# Patient Record
Sex: FEMALE | Race: BLACK OR AFRICAN AMERICAN | Employment: FULL TIME | URBAN - METROPOLITAN AREA
[De-identification: names, ages, dates, MRNs, and addresses within clinical notes are randomized per-mention and may not be internally consistent; named-entity substitution may affect disease eponyms.]

---

## 2017-05-19 ENCOUNTER — DOCUMENTATION ONLY (OUTPATIENT)
Dept: SLEEP MEDICINE | Age: 42
End: 2017-05-19

## 2017-07-31 ENCOUNTER — OFFICE VISIT (OUTPATIENT)
Dept: NEUROLOGY | Age: 42
End: 2017-07-31

## 2017-07-31 VITALS — OXYGEN SATURATION: 98 % | SYSTOLIC BLOOD PRESSURE: 122 MMHG | HEART RATE: 78 BPM | DIASTOLIC BLOOD PRESSURE: 79 MMHG

## 2017-07-31 DIAGNOSIS — G43.901 STATUS MIGRAINOSUS: Primary | ICD-10-CM

## 2017-07-31 RX ORDER — TOPIRAMATE 50 MG/1
100 TABLET, FILM COATED ORAL
Qty: 60 TAB | Refills: 5 | Status: SHIPPED | OUTPATIENT
Start: 2017-07-31 | End: 2018-02-22 | Stop reason: ALTCHOICE

## 2017-07-31 RX ORDER — DIPHENHYDRAMINE HCL 25 MG
TABLET ORAL
COMMUNITY
Start: 2017-07-27 | End: 2018-02-22 | Stop reason: ALTCHOICE

## 2017-07-31 RX ORDER — BUTALBITAL, ACETAMINOPHEN AND CAFFEINE 50; 325; 40 MG/1; MG/1; MG/1
TABLET ORAL
Refills: 0 | COMMUNITY
Start: 2017-07-24 | End: 2018-02-22 | Stop reason: ALTCHOICE

## 2017-07-31 RX ORDER — METHYLPREDNISOLONE 4 MG/1
TABLET ORAL
Qty: 1 DOSE PACK | Refills: 0 | Status: SHIPPED | OUTPATIENT
Start: 2017-07-31 | End: 2018-02-22 | Stop reason: ALTCHOICE

## 2017-07-31 RX ORDER — IBUPROFEN 800 MG/1
TABLET ORAL
COMMUNITY
Start: 2017-07-27 | End: 2018-02-22 | Stop reason: ALTCHOICE

## 2017-07-31 RX ORDER — SUMATRIPTAN 100 MG/1
100 TABLET, FILM COATED ORAL
Qty: 9 TAB | Refills: 5 | Status: SHIPPED | OUTPATIENT
Start: 2017-07-31 | End: 2017-11-13 | Stop reason: SDUPTHER

## 2017-07-31 RX ORDER — METOCLOPRAMIDE 10 MG/1
TABLET ORAL
Refills: 0 | COMMUNITY
Start: 2017-07-26 | End: 2018-02-22 | Stop reason: ALTCHOICE

## 2017-07-31 RX ORDER — PROCHLORPERAZINE MALEATE 10 MG
TABLET ORAL
COMMUNITY
Start: 2017-07-27 | End: 2018-02-22 | Stop reason: ALTCHOICE

## 2017-07-31 NOTE — LETTER
Dear Mark Solorzano MD, Thank you for allowing me to see your patient, Pramod Tijerina for a neurological consultation. Please see my impression and recommendations as outlined in my note. Sincerely, Weldon Romberg, MD 
[facility] at [facility] 
 
Patient states she hasn't had migraines since 2012 and then they suddetnly returned last Thursday. She went to the ER at [facility] the 24th and the 25th. Also [facility] on the 26th. NEUROLOGY NEW PATIENT CONSULTATION 
 
REFERRED BY: 
Mark Solorzano MD 
 
CHIEF COMPLAINT: 
 
 
HISTORY OF PRESENT ILLNESS HISTORY PROVIDED BY: 
Patient Other: Friend Pramod Tijerina is a 38 y.o. female who I am asked to see in consultation for migraines. She started with migraines when she was younger. In 2012 she was seen by Dr. Nai De La Torre and was placed on meds. She had a sleep study done at [facility]. She didn't get those results, because she did not come back for a visit, because she was doing well. She had relief of her migraines in 2012 with topamax. She states she just turned 38 and had her full exam and mammogram. She is doing a high protein and low carb diet for 6 months and then will get gastric bypass. Two weeks ago she had sudden onset of chills, etc. But this progressed this to full migriane. She has now had this migraine for 2 weeks. She has tried multiple medications. She is currently on a regimen of Benadryl, Reglan, and ibuprofen 3 times a day. She is also been given IV steroids several times when she was seen in the emergency room. She did have a CT of the head that was negative. She did get a repeat study and she was put on a CPAP. She is doing fine with this. She is getting adjusted today. Headache Characterization: throbbing/pressure Pain Level: 10/10 Aura: no 
Frequency/Length: daily all day Location: temples and pushing on the back of the head Nausea/Vomiting: Y Photophobia: Y Phonophobia: Y Provoking factors: loud noises, TV, screen on phone Relieving factors: medication, dim lights, sleeping Focal neurologic symptoms with headache: none Meds: 
Prior abortive tx: imitrex, sumavel Prior preventative tx: topamax Current abortive tx: benadryl 50mg TID, ibuprofen and reglan 1 tab TID (been doing this since Wed) Current preventative tx: none Family Hx of headaches/migraines: none Social: 
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 However, after having a 3 pound weight gain she became more invested in her diet in the last 2 weeks. She is significantly, all sugar, etc. 
 
Patient notes she does have a low vitamin D but is not on replacement at this time. PMH Past Medical History:  
Diagnosis Date  Headache  Hx of migraines [facility] Social History Social History  Marital status: SINGLE Spouse name: N/A  
 Number of children: N/A  
 Years of education: N/A Social History Main Topics  Smoking status: Never Smoker  Smokeless tobacco: Never Used  Alcohol use Yes Comment: social drinker  Drug use: No  
 Sexual activity: Not Currently Partners: Male Birth control/ protection: None, IUD Other Topics Concern  None Social History Narrative [facility] Family History Problem Relation Age of Onset  Seizures Maternal Grandmother ALLERGIES Allergies Allergen Reactions  Latex Unknown (comments) CURRENT MEDS Current Outpatient Prescriptions Medication Sig Dispense Refill  metoclopramide HCl (REGLAN) 10 mg tablet TAKE 1 TABLET BY MOUTH EVERY 6 HOURS IF NEEDED FOR MIGRAINE EXACERBATION  0  
 ibuprofen (MOTRIN) 800 mg tablet  butalbital-acetaminophen-caffeine (FIORICET, ESGIC) -40 mg per tablet TAKE 1-2 TABLETS EVERY 4-6 HOURS AS NEEDED FOR PAIN  0  
 ALLERGY 25 mg tablet  prochlorperazine (COMPAZINE) 10 mg tablet  SUMAtriptan (IMITREX) 100 mg tablet Take 1 Tab by mouth once as needed for Migraine for 1 dose. 9 Tab 3 REVIEW OF SYSTEMS:  
 
Y  N       Y  N  Y  N   Y  N 
  AIDS            Falls    Memory Loss     Shortness of breath Anxiety            Fatigue   Muscle Pain           Skipped beats Chest Pain     Frequent HA   Ms Weakness        Snoring Constipation  Hearing loss   Nause/Vomiting     Stomach Pain Cough           Hepatitis   Neuropathy            Swallowing difficulty Depression   Incontinence   Poor appetite         Vertigo Diarrhea         Joint Pain   Rash                      Visual disturbances Fainting          Leg Swelling   Ringing ears          Weight changes Unable to obtain  ROS due to  mental status change  sedated   intubated PREVIOUS WORKUP IMAGING: CT head negative per patient Reviewed labs from her [facility] visit. These were within normal limits. This included a CBC and CMP. LABS Results for orders placed or performed during the hospital encounter of 12/14/12 AARON, OTHER SOURCES Result Value Ref Range Specimen Description: CERVIX Special Requests: NO SPECIAL REQUESTS   
 KOH NO YEAST SEEN Report Status 12/14/2012 FINAL   
WET PREP Result Value Ref Range Clue cells CLUE CELLS PRESENT Wet prep NO TRICHOMONAS SEEN   
CHLAMYDIA/GC AMPLIFIED Result Value Ref Range Sample type SWAB Source ENDOCERVICAL Chlamydia amplified NEGATIVE  NEGATIVE  
 N. gonorrhea, amplified NEGATIVE  NEGATIVE Comment (NOTE) CULTURE, URINE Result Value Ref Range Specimen Description: URINE Special Requests: NO SPECIAL REQUESTS [place] Count >100,000 COLONIES/mL Culture result: MIXED SKIN SEFERINO ISOLATED  Report Status 12/16/2012 FINAL   
URINALYSIS W/ REFLEX CULTURE  
 Result Value Ref Range Color YELLOW Appearance CLOUDY Specific gravity 1.025 1.003 - 1.030    
 pH (UA) 7.0 5.0 - 8.0 Protein NEGATIVE  NEGATIVE MG/DL Glucose NEGATIVE  NEGATIVE MG/DL Ketone NEGATIVE  NEGATIVE MG/DL Bilirubin NEGATIVE  NEGATIVE Blood NEGATIVE  NEGATIVE Urobilinogen 1.0 0.2 - 1.0 EU/DL Nitrites NEGATIVE  NEGATIVE Leukocyte Esterase NEGATIVE  NEGATIVE  
 WBC 0-4 0 - 4 /HPF  
 RBC 0-3 0 - 5 /HPF Epithelial cells 20-50 0 - 5 /LPF Bacteria 3+ (A) NEGATIVE /HPF  
 UA:UC IF INDICATED URINE CULTURE ORDERED Mucus 2+ (A) NEGATIVE /LPF HCG URINE, QL. - POC Result Value Ref Range Pregnancy test,urine (POC) NEGATIVE  NEGATIVE  
POC CHEM8 Result Value Ref Range Calcium, ionized (POC) 1.22 1.12 - 1.32 MMOL/L Sodium (POC) 140 136 - 145 MMOL/L Potassium (POC) 3.9 3.5 - 5.1 MMOL/L Chloride (POC) 103 98 - 107 MMOL/L  
 CO2 (POC) 27 21 - 32 MMOL/L Anion gap (POC) 15 5 - 15 mmol/L Glucose (POC) 94 65 - 105 MG/DL  
 BUN (POC) 7 (L) 9 - 20 MG/DL Creatinine (POC) 0.8 0.6 - 1.3 MG/DL  
 GFRAA, POC >60 >60 ml/min/1.73m2 GFRNA, POC >60 >60 ml/min/1.73m2 Hemoglobin (POC) 12.2 11.5 - 16.0 GM/DL Hematocrit (POC) 36 35.0 - 47.0 % Comment Comment Not Indicated. PHYSICAL EXAM 
Visit Vitals  /79  Pulse 78  SpO2 98% General:  Alert, cooperative, no distress. Head:  Normocephalic, without obvious abnormality, atraumatic. Eyes:  Conjunctivae/corneas clear. Pupils equal, round, reactive to light. Extraocular movements intact, VFF, NO papilledema Lungs: 
Heart:   Non labored breathing Regular rate and rhythm, no carotid bruits Abdomen:   Soft, non-distended Extremities: Extremities normal, atraumatic, no cyanosis or edema. Pulses: 2+ and symmetric all extremities. Skin: Skin color, texture, turgor normal. No rashes or lesions.   
Neurologic:  Gen: Attention normal 
 Language: naming, repetition, fluency normal 
           Memory: intact recent and remote memory Cranial Nerves: 
I: smell Not tested II: visual fields Full to confrontation II: pupils Equal, round, reactive to light II: optic disc No papilledema III,VII: ptosis none III,IV,VI: extraocular muscles  Full ROM V: mastication normal  
V: facial light touch sensation  normal  
VII: facial muscle function   symmetric VIII: hearing symmetric IX: soft palate elevation  normal  
XI: trapezius strength  5/5 XI: sternocleidomastoid strength 5/5 XI: neck flexion strength  5/5 XII: tongue  midline Motor: normal bulk and tone, no tremor Strength: 5/5 all four extremities Sensory: intact to LT, PP, vibration, and temperature Coordination: FTN intact, Rhomberg negative Gait: normal gait including tandem Reflexes: 2+ throughout IMPRESSION Jaylen Kessler is a 38 y.o. female who presents for evaluation of migraines. Patient does have a history of migraines. They have acutely become exacerbated in the last 2 weeks. Currently she is having status migrainosus. She has had multiple hospital visits due to this. Will start on a preventative and attempt to fix her abortive regimen. I am concerned she may be experiencing rebound headache at this point and went to stop this. She did have CT of the head that was negative and neuro exam is nonfocal so will not do further imaging at this time. RECOMMENDATIONS 1. We will start preventative with Topamax. Will start 25 mg nightly and titrate. Side effects discussed with patient. 2.  We will do Imitrex for abortive. Advised patient to only use the Reglan/ibuprofen/Benadryl combo for severe migraine and not as a scheduled regimen 3. Will call if needs to do a Nancy protocol 4. Prior records from [facility] reviewed. These are in the media tab. 5.  CT head was negative. Will hold off on further imaging at this time. 6.  Migraine book given 7. Encouraged patient to have her CPAP evaluated today to see if adjustments are necessary. FU 3 months Rhonda Langley MD 
 
CC: Villa Staley MD 
Fax: [phone] This note was created using voice recognition software. Despite editing, there may be syntax errors. This note will not be viewable in [address]

## 2017-07-31 NOTE — MR AVS SNAPSHOT
Visit Information Date & Time Provider Department Dept. Phone Encounter #  
 7/31/2017 11:00 AM Britney Fallon MD [address] [facility] [phone] [id number] Upcoming Health Maintenance Date Due DTaP/Tdap/Td series (1 - Tdap) 1/26/1998 PAP AKA CERVICAL CYTOLOGY 1/26/1998 INFLUENZA AGE 9 TO ADULT 8/1/2017 Allergies as of 7/31/2017  Review Complete On: 7/31/2017 By: Neel Yo LPN Severity Noted Reaction Type Reactions Latex  06/07/2011    Unknown (comments) Current Immunizations  Never Reviewed No immunizations on file. Not reviewed this visit Vitals BP Pulse SpO2 OB Status Smoking Status 122/79 78 98% Having regular periods Never Smoker Preferred Pharmacy Pharmacy Name Phone [id number] [id number] [facility] [phone] Your Updated Medication List  
  
   
This list is accurate as of: 7/31/17 11:36 AM.  Always use your most recent med list.  
  
  
  
  
 Allergy 25 mg tablet Generic drug:  diphenhydrAMINE  
  
 butalbital-acetaminophen-caffeine -40 mg per tablet Commonly known as:  FIORICET, ESGIC  
TAKE 1-2 TABLETS EVERY 4-6 HOURS AS NEEDED FOR PAIN  
  
 ibuprofen 800 mg tablet Commonly known as:  MOTRIN  
  
 methylPREDNISolone 4 mg tablet Commonly known as:  Mavis Adrián Per dose pack instructions  
  
 metoclopramide HCl 10 mg tablet Commonly known as:  REGLAN  
TAKE 1 TABLET BY MOUTH EVERY 6 HOURS IF NEEDED FOR MIGRAINE EXACERBATION  
  
 prochlorperazine 10 mg tablet Commonly known as:  COMPAZINE  
  
 * SUMAtriptan 100 mg tablet Commonly known as:  IMITREX Take 1 Tab by mouth once as needed for Migraine for 1 dose. * SUMAtriptan 100 mg tablet Commonly known as:  IMITREX Take 1 Tab by mouth once as needed for Migraine for up to 1 dose. topiramate 50 mg tablet Commonly known as:  TOPAMAX Take 2 Tabs by mouth nightly. * Notice: This list has 2 medication(s) that are the same as other medications prescribed for you. Read the directions carefully, and ask your doctor or other care provider to review them with you. Prescriptions Sent to Pharmacy Refills  
 methylPREDNISolone (MEDROL DOSEPACK) 4 mg tablet 0 Sig: Per dose pack instructions Class: Normal  
 [facility] [address] Ph #: [phone] SUMAtriptan (IMITREX) 100 mg tablet 5 Sig: Take 1 Tab by mouth once as needed for Migraine for up to 1 dose. Class: Normal  
 [facility] [address] [place] [address] Ph #: [phone] Route: Oral  
 topiramate (TOPAMAX) 50 mg tablet 5 Sig: Take 2 Tabs by mouth nightly. Class: Normal  
 Pharmacy: [address] Ph #: [phone] Route: Oral  
  
Patient Instructions PRESCRIPTION REFILL POLICY [facility] Statement to Patients April 1, 2014 In an effort to ensure the large volume of patient prescription refills is processed in the most efficient and expeditious manner, we are asking our patients to assist us by calling your Pharmacy for all prescription refills, this will include also your  Mail Order Pharmacy. The pharmacy will contact our office electronically to continue the refill process. Please do not wait until the last minute to call your pharmacy. We need at least 48 hours (2days) to fill prescriptions. We also encourage you to call your pharmacy before going to  your prescription to make sure it is ready.   
 
With regard to controlled substance prescription refill requests (narcotic refills) that need to be picked up at our office, we ask your cooperation by providing us with at least 72 hours (3days) notice that you will need a refill. We will not refill narcotic prescription refill requests after 4:00pm on any weekday, Monday through Thursday, or after 2:00pm on Fridays, or on the weekends. We encourage everyone to explore another way of getting your prescription refill request processed using [employer], our patient web portal through our electronic medical record system. [employer] is an efficient and effective way to communicate your medication request directly to the office and  downloadable as an fouzia on your smart phone . [employer] also features a review functionality that allows you to view your medication list as well as leave messages for your physician. Are you ready to get connected? If so please review the attatched instructions or speak to any of our staff to get you set up right away! Thank you so much for your cooperation. Should you have any questions please contact our Practice Administrator. The Physicians and Staff,  [facility] Patient Instructions/Plans: 
 
Topamax 50mg tabs Take 1/2 tab at night for one week then Take one tab at night for one week then Take one and 1/2  tabs at night for one week then Take two tabs at night If you have issues with this medication or need an increase in the dosage please call the office for further instructions Please take the steroids for one week. Take imitrex as needed. Try to limit the benadryl/ibuprofen/reglan meds to only as needed Call us if we need to do IV infusions for the migraines Topiramate (By mouth) Topiramate (toe-PIR-a-mate) Treats and prevents seizures, and helps prevent migraine headaches. Brand Name(s): Qudexy XR, Topamax, Trokendi XR There may be other brand names for this medicine. When This Medicine Should Not Be Used: This medicine is not right for everyone.  Do not use it if you had an allergic reaction to topiramate, or if you are pregnant. How to Use This Medicine:  
Capsule, Long Acting Capsule, Tablet · Take your medicine as directed. Your dose may need to be changed several times to find what works best for you. · Tablet: Swallow whole. Do not break, crush, or chew the tablet. It has a very bitter taste. · Capsule or extended-release capsule: Do not crush or chew the capsule. Swallow whole or open the capsule and pour the medicine into a small amount (1 teaspoon) of soft food, such as applesauce. Swallow the mixture right away without chewing. Do not store the mixture for use at a later time. · Drink extra fluids so you will urinate more often and help prevent kidney problems. · This medicine should come with a Medication Guide. Ask your pharmacist for a copy if you do not have one. · Missed dose: Take a dose as soon as you remember. If it is almost time for your next dose, wait until then and take a regular dose. Do not take extra medicine to make up for a missed dose. If you miss a dose or forget to use your medicine, use it as soon as you can. If your next regular dose of Topamax® is less than 6 hours away, wait until then to use the medicine and skip the missed dose. If you miss more than 1 dose of Topamax®, call your doctor for instructions. · Store the medicine in a closed container at room temperature, away from heat, moisture, and direct light. Drugs and Foods to Avoid: Ask your doctor or pharmacist before using any other medicine, including over-the-counter medicines, vitamins, and herbal products. · Do not drink alcohol with Qudexy XR or Topamax®. Do not drink alcohol for 6 hours before and 6 hours after you take the Trokendi XR capsule. · Some medicines can affect how topiramate works.  Tell your doctor if you are using acetazolamide, dichlorphenamide, dichloralphenazone, digoxin, lithium, metformin, zonisamide, other medicine for seizures (such as carbamazepine, phenytoin, valproic acid), or birth control pills. · Tell your doctor if you are using any medicine that makes you sleepy, such as allergy medicine or narcotic pain medicine. Warnings While Using This Medicine: · It is not safe to take this medicine during pregnancy. It could harm an unborn baby. Tell your doctor right away if you become pregnant. · Tell your doctor if you are breastfeeding, or if you have kidney disease, liver disease, glaucoma, lung or breathing problems, osteoporosis, or a history of depression or mood disorders. Tell your doctor if you are on a ketogenic diet (high in fat and low in carbohydrates). · This medicine may cause the following problems: ¨ Eye pain or vision changes, including glaucoma ¨ Changes in body temperature ¨ Metabolic acidosis (too much acid in the blood) ¨ Kidney stones · This medicine may increase depression or thoughts of suicide. Tell your doctor right away if you start to feel more depressed or think about hurting yourself. · This medicine may make you dizzy, drowsy, or tired. Do not drive or do anything else that could be dangerous until you know how this medicine affects you. · Do not stop using this medicine suddenly. Your doctor will need to slowly decrease your dose before you stop it completely. · Your doctor will do lab tests at regular visits to check on the effects of this medicine. Keep all appointments. · Keep all medicine out of the reach of children. Never share your medicine with anyone. Possible Side Effects While Using This Medicine:  
Call your doctor right away if you notice any of these side effects: · Allergic reaction: Itching or hives, swelling in your face or hands, swelling or tingling in your mouth or throat, chest tightness, trouble breathing · Bloody or cloudy urine, painful urination, sudden lower back or stomach pain · Changes in vision, eye pain · Confusion, problems with walking, clumsiness, dizziness, or trouble talking, concentrating, or remembering · Feeling agitated, depressed, nervous, or irritable, thoughts of hurting yourself or others, unusual mood or behavior · Fever, decreased sweating · Numbness, tingling, or burning pain in your hands, arms, legs, or feet · Rapid, deep breathing, loss of appetite, fast or uneven heartbeat · Vomiting, unusual drowsiness, tiredness, or weakness If you notice these less serious side effects, talk with your doctor: · Change in taste · Nausea, diarrhea · Stuffy or runny nose · Weight loss If you notice other side effects that you think are caused by this medicine, tell your doctor. Call your doctor for medical advice about side effects. You may report side effects to FDA at 4-494-EGH-0500 © 2017 [facility] Information is for End User's use only and may not be sold, redistributed or otherwise used for commercial purposes. The above information is an  only. It is not intended as medical advice for individual conditions or treatments. Talk to your doctor, nurse or pharmacist before following any medical regimen to see if it is safe and effective for you. Introducing [facility]! Madyson Joseph introduces [employer] patient portal. Now you can access parts of your medical record, email your doctor's office, and request medication refills online. 1. In your internet browser, go to [email or web address] 2. Click on the First Time User? Click Here link in the Sign In box. You will see the New Member Sign Up page. 3. Enter your [employer] Access Code exactly as it appears below. You will not need to use this code after youve completed the sign-up process. If you do not sign up before the expiration date, you must request a new code. · [employer] Access Code: [email or web address] Expires: 10/29/2017 10:32 AM 
 
 4. Enter the last four digits of your Social Security Number (xxxx) and Date of Birth (mm/dd/yyyy) as indicated and click Submit. You will be taken to the next sign-up page. 5. Create a One4All ID. This will be your One[employer]All login ID and cannot be changed, so think of one that is secure and easy to remember. 6. Create a One[employer]All password. You can change your password at any time. 7. Enter your Password Reset Question and Answer. This can be used at a later time if you forget your password. 8. Enter your e-mail address. You will receive e-mail notification when new information is available in [address] 9. Click Sign Up. You can now view and download portions of your medical record. 10. Click the Download Summary menu link to download a portable copy of your medical information. If you have questions, please visit the Frequently Asked Questions section of the [employer] website. Remember, One[employer]All is NOT to be used for urgent needs. For medical emergencies, dial 911. Now available from your iPhone and Android! Please provide this summary of care documentation to your next provider. Your primary care clinician is listed as FELICITA NAIR. If you have any questions after today's visit, please call [phone].

## 2017-07-31 NOTE — PATIENT INSTRUCTIONS
[facility]   Statement to Patients  April 1, 2014      In an effort to ensure the large volume of patient prescription refills is processed in the most efficient and expeditious manner, we are asking our patients to assist us by calling your Pharmacy for all prescription refills, this will include also your  Mail Order Pharmacy. The pharmacy will contact our office electronically to continue the refill process. Please do not wait until the last minute to call your pharmacy. We need at least 48 hours (2days) to fill prescriptions. We also encourage you to call your pharmacy before going to  your prescription to make sure it is ready. With regard to controlled substance prescription refill requests (narcotic refills) that need to be picked up at our office, we ask your cooperation by providing us with at least 72 hours (3days) notice that you will need a refill. We will not refill narcotic prescription refill requests after 4:00pm on any weekday, Monday through Thursday, or after 2:00pm on Fridays, or on the weekends. We encourage everyone to explore another way of getting your prescription refill request processed using [employer], our patient web portal through our electronic medical record system. [employer] is an efficient and effective way to communicate your medication request directly to the office and  downloadable as an fouzia on your smart phone . [employer] also features a review functionality that allows you to view your medication list as well as leave messages for your physician. Are you ready to get connected? If so please review the attatched instructions or speak to any of our staff to get you set up right away! Thank you so much for your cooperation. Should you have any questions please contact our Practice Administrator.     The Physicians and Staff,  [facility]     Patient Instructions/Plans:    Topamax 50mg tabs  Take 1/2 tab at night for one week then  Take one tab at night for one week then  Take one and 1/2  tabs at night for one week then  Take two tabs at night    If you have issues with this medication or need an increase in the dosage please call the office for further instructions    Please take the steroids for one week. Take imitrex as needed. Try to limit the benadryl/ibuprofen/reglan meds to only as needed  Call us if we need to do IV infusions for the migraines      Topiramate (By mouth)   Topiramate (toe-PIR-a-mate)  Treats and prevents seizures, and helps prevent migraine headaches. Brand Name(s): Qudexy XR, Topamax, Trokendi XR   There may be other brand names for this medicine. When This Medicine Should Not Be Used: This medicine is not right for everyone. Do not use it if you had an allergic reaction to topiramate, or if you are pregnant. How to Use This Medicine:   Capsule, Long Acting Capsule, Tablet  · Take your medicine as directed. Your dose may need to be changed several times to find what works best for you. · Tablet: Swallow whole. Do not break, crush, or chew the tablet. It has a very bitter taste. · Capsule or extended-release capsule: Do not crush or chew the capsule. Swallow whole or open the capsule and pour the medicine into a small amount (1 teaspoon) of soft food, such as applesauce. Swallow the mixture right away without chewing. Do not store the mixture for use at a later time. · Drink extra fluids so you will urinate more often and help prevent kidney problems. · This medicine should come with a Medication Guide. Ask your pharmacist for a copy if you do not have one. · Missed dose: Take a dose as soon as you remember. If it is almost time for your next dose, wait until then and take a regular dose. Do not take extra medicine to make up for a missed dose. If you miss a dose or forget to use your medicine, use it as soon as you can.  If your next regular dose of Topamax® is less than 6 hours away, wait until then to use the medicine and skip the missed dose. If you miss more than 1 dose of Topamax®, call your doctor for instructions. · Store the medicine in a closed container at room temperature, away from heat, moisture, and direct light. Drugs and Foods to Avoid:   Ask your doctor or pharmacist before using any other medicine, including over-the-counter medicines, vitamins, and herbal products. · Do not drink alcohol with Qudexy XR or Topamax®. Do not drink alcohol for 6 hours before and 6 hours after you take the Trokendi XR capsule. · Some medicines can affect how topiramate works. Tell your doctor if you are using acetazolamide, dichlorphenamide, dichloralphenazone, digoxin, lithium, metformin, zonisamide, other medicine for seizures (such as carbamazepine, phenytoin, valproic acid), or birth control pills. · Tell your doctor if you are using any medicine that makes you sleepy, such as allergy medicine or narcotic pain medicine. Warnings While Using This Medicine:   · It is not safe to take this medicine during pregnancy. It could harm an unborn baby. Tell your doctor right away if you become pregnant. · Tell your doctor if you are breastfeeding, or if you have kidney disease, liver disease, glaucoma, lung or breathing problems, osteoporosis, or a history of depression or mood disorders. Tell your doctor if you are on a ketogenic diet (high in fat and low in carbohydrates). · This medicine may cause the following problems:  ¨ Eye pain or vision changes, including glaucoma  ¨ Changes in body temperature  ¨ Metabolic acidosis (too much acid in the blood)  ¨ Kidney stones  · This medicine may increase depression or thoughts of suicide. Tell your doctor right away if you start to feel more depressed or think about hurting yourself. · This medicine may make you dizzy, drowsy, or tired.  Do not drive or do anything else that could be dangerous until you know how this medicine affects you.  · Do not stop using this medicine suddenly. Your doctor will need to slowly decrease your dose before you stop it completely. · Your doctor will do lab tests at regular visits to check on the effects of this medicine. Keep all appointments. · Keep all medicine out of the reach of children. Never share your medicine with anyone. Possible Side Effects While Using This Medicine:   Call your doctor right away if you notice any of these side effects:  · Allergic reaction: Itching or hives, swelling in your face or hands, swelling or tingling in your mouth or throat, chest tightness, trouble breathing  · Bloody or cloudy urine, painful urination, sudden lower back or stomach pain  · Changes in vision, eye pain  · Confusion, problems with walking, clumsiness, dizziness, or trouble talking, concentrating, or remembering  · Feeling agitated, depressed, nervous, or irritable, thoughts of hurting yourself or others, unusual mood or behavior  · Fever, decreased sweating  · Numbness, tingling, or burning pain in your hands, arms, legs, or feet  · Rapid, deep breathing, loss of appetite, fast or uneven heartbeat  · Vomiting, unusual drowsiness, tiredness, or weakness  If you notice these less serious side effects, talk with your doctor:   · Change in taste  · Nausea, diarrhea  · Stuffy or runny nose  · Weight loss  If you notice other side effects that you think are caused by this medicine, tell your doctor. Call your doctor for medical advice about side effects. You may report side effects to FDA at 2-059-FDA-6745  © 2017 [facility] Information is for End User's use only and may not be sold, redistributed or otherwise used for commercial purposes. The above information is an  only. It is not intended as medical advice for individual conditions or treatments. Talk to your doctor, nurse or pharmacist before following any medical regimen to see if it is safe and effective for you.

## 2017-07-31 NOTE — PROGRESS NOTES
Patient states she hasn't had migraines since 2012 and then they suddetnly returned last Thursday. She went to the ER at [facility] the 24th and the 25th. Also [facility] on the 26th.

## 2017-07-31 NOTE — PROGRESS NOTES
NEUROLOGY NEW PATIENT CONSULTATION    REFERRED BY:  Kristy Byrd MD    CHIEF COMPLAINT:      HISTORY OF PRESENT ILLNESS    HISTORY PROVIDED BY:  Patient  Other: Diana Chahal is a 38 y.o. female who I am asked to see in consultation for migraines. She started with migraines when she was younger. In 2012 she was seen by Dr. Garret Love and was placed on meds. She had a sleep study done at [facility]. She didn't get those results, because she did not come back for a visit, because she was doing well. She had relief of her migraines in 2012 with topamax. She states she just turned 38 and had her full exam and mammogram. She is doing a high protein and low carb diet for 6 months and then will get gastric bypass. Two weeks ago she had sudden onset of chills, etc. But this progressed this to full migriane. She has now had this migraine for 2 weeks. She has tried multiple medications. She is currently on a regimen of Benadryl, Reglan, and ibuprofen 3 times a day. She is also been given IV steroids several times when she was seen in the emergency room. She did have a CT of the head that was negative. She did get a repeat study and she was put on a CPAP. She is doing fine with this. She is getting adjusted today.      Headache Characterization: throbbing/pressure  Pain Level: 10/10  Aura: no  Frequency/Length: daily all day  Location: temples and pushing on the back of the head  Nausea/Vomiting: Y  Photophobia: Y  Phonophobia: Y  Provoking factors: loud noises, TV, screen on phone  Relieving factors: medication, dim lights, sleeping  Focal neurologic symptoms with headache: none    Meds:  Prior abortive tx: imitrex, sumavel  Prior preventative tx: topamax    Current abortive tx: benadryl 50mg TID, ibuprofen and reglan 1 tab TID (been doing this since Wed)  Current preventative tx: none    Family Hx of headaches/migraines: none    Social:  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 However, after having a 3 pound weight gain she became more invested in her diet in the last 2 weeks. She is significantly, all sugar, etc.    Patient notes she does have a low vitamin D but is not on replacement at this time. PMH  Past Medical History:   Diagnosis Date    Headache     Hx of migraines        SH  Social History     Social History    Marital status: SINGLE     Spouse name: N/A    Number of children: N/A    Years of education: N/A     Social History Main Topics    Smoking status: Never Smoker    Smokeless tobacco: Never Used    Alcohol use Yes      Comment: social drinker    Drug use: No    Sexual activity: Not Currently     Partners: Male     Birth control/ protection: None, IUD     Other Topics Concern    None     Social History Narrative       FH  Family History   Problem Relation Age of Onset    Seizures Maternal Grandmother        ALLERGIES  Allergies   Allergen Reactions    Latex Unknown (comments)       CURRENT MEDS  Current Outpatient Prescriptions   Medication Sig Dispense Refill    metoclopramide HCl (REGLAN) 10 mg tablet TAKE 1 TABLET BY MOUTH EVERY 6 HOURS IF NEEDED FOR MIGRAINE EXACERBATION  0    ibuprofen (MOTRIN) 800 mg tablet       butalbital-acetaminophen-caffeine (FIORICET, ESGIC) -40 mg per tablet TAKE 1-2 TABLETS EVERY 4-6 HOURS AS NEEDED FOR PAIN  0    ALLERGY 25 mg tablet       prochlorperazine (COMPAZINE) 10 mg tablet       SUMAtriptan (IMITREX) 100 mg tablet Take 1 Tab by mouth once as needed for Migraine for 1 dose.  9 Tab 3       REVIEW OF SYSTEMS:     Y  N       Y  N  Y  N   Y  N  [] [x] AIDS          [] [x] Falls  [] [x] Memory Loss  [x] []  Shortness of breath  [] [x] Anxiety          [x] [] Fatigue [] [x] Muscle Pain        [x] []  Skipped beats  [] [x] Chest Pain   [x] [] Frequent HA [] [x] Ms Weakness     [x] []  Snoring  [] [x] Constipation [] [x]Hearing loss [x] [] Nause/Vomiting  [] [x]  Stomach Pain  [] [x] Cough          [] [x]Hepatitis [] [x] Neuropathy         [] [x]  Swallowing difficulty  [] [x] Depression  [] [x]Incontinence [] [x] Poor appetite      [] [x]  Vertigo  [] [x] Diarrhea       [] [x] Joint Pain [] [x] Rash                   [] [x]  Visual disturbances  [] [x] Fainting        [] [x] Leg Swelling [x] [] Ringing ears       [] [x]  Weight changes      []Unable to obtain  ROS due to  []mental status change  []sedated   []intubated          PREVIOUS WORKUP  IMAGING: CT head negative per patient    Reviewed labs from her [facility] visit. These were within normal limits. This included a CBC and CMP.     LABS  Results for orders placed or performed during the hospital encounter of 12/14/12   AARON, OTHER SOURCES   Result Value Ref Range    Specimen Description: CERVIX     Special Requests: NO SPECIAL REQUESTS     KOH NO YEAST SEEN     Report Status 12/14/2012 FINAL    WET PREP   Result Value Ref Range    Clue cells CLUE CELLS PRESENT     Wet prep NO TRICHOMONAS SEEN    CHLAMYDIA/GC AMPLIFIED   Result Value Ref Range    Sample type SWAB     Source ENDOCERVICAL     Chlamydia amplified NEGATIVE  NEGATIVE    N. gonorrhea, amplified NEGATIVE  NEGATIVE    Comment (NOTE)    CULTURE, URINE   Result Value Ref Range    Specimen Description: URINE     Special Requests: NO SPECIAL REQUESTS     [place] Count >100,000 COLONIES/mL     Culture result: MIXED SKIN SEFERINO ISOLATED     Report Status 12/16/2012 FINAL    URINALYSIS W/ REFLEX CULTURE   Result Value Ref Range    Color YELLOW     Appearance CLOUDY     Specific gravity 1.025 1.003 - 1.030      pH (UA) 7.0 5.0 - 8.0      Protein NEGATIVE  NEGATIVE MG/DL    Glucose NEGATIVE  NEGATIVE MG/DL    Ketone NEGATIVE  NEGATIVE MG/DL    Bilirubin NEGATIVE  NEGATIVE    Blood NEGATIVE  NEGATIVE    Urobilinogen 1.0 0.2 - 1.0 EU/DL    Nitrites NEGATIVE  NEGATIVE    Leukocyte Esterase NEGATIVE  NEGATIVE    WBC 0-4 0 - 4 /HPF    RBC 0-3 0 - 5 /HPF    Epithelial cells 20-50 0 - 5 /LPF    Bacteria 3+ (A) NEGATIVE /HPF    UA:UC IF INDICATED URINE CULTURE ORDERED     Mucus 2+ (A) NEGATIVE /LPF   HCG URINE, QL. - POC   Result Value Ref Range    Pregnancy test,urine (POC) NEGATIVE  NEGATIVE   POC CHEM8   Result Value Ref Range    Calcium, ionized (POC) 1.22 1.12 - 1.32 MMOL/L    Sodium (POC) 140 136 - 145 MMOL/L    Potassium (POC) 3.9 3.5 - 5.1 MMOL/L    Chloride (POC) 103 98 - 107 MMOL/L    CO2 (POC) 27 21 - 32 MMOL/L    Anion gap (POC) 15 5 - 15 mmol/L    Glucose (POC) 94 65 - 105 MG/DL    BUN (POC) 7 (L) 9 - 20 MG/DL    Creatinine (POC) 0.8 0.6 - 1.3 MG/DL    GFRAA, POC >60 >60 ml/min/1.73m2    GFRNA, POC >60 >60 ml/min/1.73m2    Hemoglobin (POC) 12.2 11.5 - 16.0 GM/DL    Hematocrit (POC) 36 35.0 - 47.0 %    Comment Comment Not Indicated. PHYSICAL EXAM  Visit Vitals    /79    Pulse 78    SpO2 98%     General:  Alert, cooperative, no distress. Head:  Normocephalic, without obvious abnormality, atraumatic. Eyes:  Conjunctivae/corneas clear. Pupils equal, round, reactive to light. Extraocular movements intact, VFF, NO papilledema   Lungs:  Heart:   Non labored breathing  Regular rate and rhythm, no carotid bruits   Abdomen:   Soft, non-distended   Extremities: Extremities normal, atraumatic, no cyanosis or edema. Pulses: 2+ and symmetric all extremities. Skin: Skin color, texture, turgor normal. No rashes or lesions.    Neurologic:  Gen: Attention normal             Language: naming, repetition, fluency normal             Memory: intact recent and remote memory  Cranial Nerves:  I: smell Not tested   II: visual fields Full to confrontation   II: pupils Equal, round, reactive to light   II: optic disc No papilledema   III,VII: ptosis none   III,IV,VI: extraocular muscles  Full ROM   V: mastication normal   V: facial light touch sensation  normal   VII: facial muscle function   symmetric   VIII: hearing symmetric   IX: soft palate elevation  normal   XI: trapezius strength  5/5   XI: sternocleidomastoid strength 5/5   XI: neck flexion strength  5/5   XII: tongue  midline     Motor: normal bulk and tone, no tremor              Strength: 5/5 all four extremities  Sensory: intact to LT, PP, vibration, and temperature  Coordination: FTN intact, Rhomberg negative  Gait: normal gait including tandem   Reflexes: 2+ throughout       [id number] Amber Rachel is a 38 y.o. female who presents for evaluation of migraines. Patient does have a history of migraines. They have acutely become exacerbated in the last 2 weeks. Currently she is having status migrainosus. She has had multiple hospital visits due to this. Will start on a preventative and attempt to fix her abortive regimen. I am concerned she may be experiencing rebound headache at this point and went to stop this. She did have CT of the head that was negative and neuro exam is nonfocal so will not do further imaging at this time. RECOMMENDATIONS  1. We will start preventative with Topamax. Will start 25 mg nightly and titrate. Side effects discussed with patient. 2.  We will do Imitrex for abortive. Advised patient to only use the Reglan/ibuprofen/Benadryl combo for severe migraine and not as a scheduled regimen  3. Will call if needs to do a Nancy protocol  4. Prior records from [facility] reviewed. These are in the media tab. 5.  CT head was negative. Will hold off on further imaging at this time. 6.  Migraine book given  7. Encouraged patient to have her CPAP evaluated today to see if adjustments are necessary. FU 3 months    Rhonda Langley MD    CC: Villa Staley MD  Fax: [phone]    This note was created using voice recognition software. Despite editing, there may be syntax errors. This note will not be viewable in [address]

## 2017-11-13 ENCOUNTER — OFFICE VISIT (OUTPATIENT)
Dept: FAMILY MEDICINE CLINIC | Age: 42
End: 2017-11-13

## 2017-11-13 VITALS — WEIGHT: 293 LBS | BODY MASS INDEX: 45.99 KG/M2 | HEIGHT: 67 IN

## 2017-11-13 NOTE — PROGRESS NOTES
Chief Complaint   Patient presents with   [address]      Having weight loss surgery on 12/18/18 with Dr. Nicole Thapa

## 2018-02-22 ENCOUNTER — OFFICE VISIT (OUTPATIENT)
Dept: FAMILY MEDICINE CLINIC | Age: 43
End: 2018-02-22

## 2018-02-22 VITALS
HEIGHT: 67 IN | TEMPERATURE: 98.1 F | DIASTOLIC BLOOD PRESSURE: 68 MMHG | WEIGHT: 286.13 LBS | SYSTOLIC BLOOD PRESSURE: 100 MMHG | BODY MASS INDEX: 44.91 KG/M2 | OXYGEN SATURATION: 98 % | RESPIRATION RATE: 16 BRPM | HEART RATE: 110 BPM

## 2018-02-22 DIAGNOSIS — E86.0 DEHYDRATION: Primary | ICD-10-CM

## 2018-02-22 RX ORDER — URSODIOL 300 MG/1
CAPSULE ORAL
Refills: 5 | COMMUNITY
Start: 2018-01-01

## 2018-02-22 NOTE — MR AVS SNAPSHOT
[address] [id number] 
[phone] Patient: Andressa Kay MRN: [id number] :1975 Visit Information Date & Time Provider Department Dept. Phone Encounter #  
 2018  2:15 PM Anya Chandra [phone] [id number] Follow-up Instructions Return for recheck dizzy spells 10 days. Upcoming Health Maintenance Date Due DTaP/Tdap/Td series (1 - Tdap) 1998 PAP AKA CERVICAL CYTOLOGY 1998 Influenza Age 5 to Adult 2017 Allergies as of 2018  Review Complete On: 2018 By: Francisco Terrazas LPN Severity Noted Reaction Type Reactions Latex  2011    Unknown (comments) Current Immunizations  Never Reviewed No immunizations on file. Not reviewed this visit You Were Diagnosed With   
  
 Codes Comments Dehydration    -  Primary ICD-10-CM: E86.0 ICD-9-CM: 276.51 Vitals BP Pulse Temp Resp Height(growth percentile) Weight(growth percentile) 100/68 (!) 110 98.1 °F (36.7 °C) (Oral) 16 5' 7\" (1.702 m) 286 lb 2 oz (129.8 kg) LMP SpO2 BMI OB Status Smoking Status 2018 (Exact Date) 98% 44.81 kg/m2 Having regular periods Never Smoker Vitals History BMI and BSA Data Body Mass Index Body Surface Area 44.81 kg/m 2 2.48 m 2 Preferred Pharmacy Pharmacy Name Phone [id number], [place] - [address] [facility] [phone] Your Updated Medication List  
  
   
This list is accurate as of 18  2:42 PM.  Always use your most recent med list.  
  
  
  
  
 ursodiol 300 mg capsule Commonly known as:  ACTIGALL TAKE 1 CAPSULE BY MOUTH TWICE DAILY FOR 30 DAYS Follow-up Instructions Return for recheck dizzy spells 10 days. Introducing [address]! Angel Luis Aguilera introduces [employer] patient portal. Now you can access parts of your medical record, email your doctor's office, and request medication refills online. 1. In your internet browser, go to [email or web address] 2. Click on the First Time User? Click Here link in the Sign In box. You will see the New Member Sign Up page. 3. Enter your [employer] Access Code exactly as it appears below. You will not need to use this code after youve completed the sign-up process. If you do not sign up before the expiration date, you must request a new code. · [employer] Access Code: [email or web address] Expires: 5/23/2018  2:41 PM 
 
4. Enter the last four digits of your Social Security Number (xxxx) and Date of Birth (mm/dd/yyyy) as indicated and click Submit. You will be taken to the next sign-up page. 5. Create a [employer] ID. This will be your [employer] login ID and cannot be changed, so think of one that is secure and easy to remember. 6. Create a [employer] password. You can change your password at any time. 7. Enter your Password Reset Question and Answer. This can be used at a later time if you forget your password. 8. Enter your e-mail address. You will receive e-mail notification when new information is available in [address] 9. Click Sign Up. You can now view and download portions of your medical record. 10. Click the Download Summary menu link to download a portable copy of your medical information. If you have questions, please visit the Frequently Asked Questions section of the [employer] website. Remember, [employer] is NOT to be used for urgent needs. For medical emergencies, dial 911. Now available from your iPhone and Android! Please provide this summary of care documentation to your next provider. Your primary care clinician is listed as FELICITA NAIR. If you have any questions after today's visit, please call [phone].

## 2018-02-22 NOTE — PROGRESS NOTES
1. Have you been to the ER, urgent care clinic since your last visit? Hospitalized since your last visit? Yes [facility] on 2/21/18 & 2/19/18 for syncopy and upper GI    2. Have you seen or consulted any other health care providers outside of the [address] [place] [address] since your last visit? Include any pap smears or colon screening. Yes Cardiologist x 1 wk for heart monitor    Chief Complaint   Patient presents with    Loss of Consciousness     pt had gastric bypass 12/18/18 and has been passing out ever since     Pt states she has have multiple syncopal episodes x 10 with subsequent ED visits. She has not been able to keep liquid down and vomits frequently. She had upper GI yesterday and they stretched her esophagus. She was able to keep a banana down today.

## 2018-02-25 PROBLEM — E66.01 OBESITY, MORBID (HCC): Status: ACTIVE | Noted: 2018-02-25

## 2018-03-01 ENCOUNTER — DOCUMENTATION ONLY (OUTPATIENT)
Dept: FAMILY MEDICINE CLINIC | Age: 43
End: 2018-03-01

## 2018-03-01 NOTE — PROGRESS NOTES
Rec'd medical records request from [facility], faxed request to [employer] for processing on 02/27/18, confirmation rec'd.

## 2021-07-09 ENCOUNTER — TRANSCRIBE ORDER (OUTPATIENT)
Dept: SCHEDULING | Age: 46
End: 2021-07-09

## 2021-07-09 DIAGNOSIS — Z12.31 VISIT FOR SCREENING MAMMOGRAM: Primary | ICD-10-CM

## 2021-07-14 ENCOUNTER — HOSPITAL ENCOUNTER (OUTPATIENT)
Dept: MAMMOGRAPHY | Age: 46
Discharge: HOME | End: 2021-07-14
Attending: OBSTETRICS & GYNECOLOGY
Payer: COMMERCIAL

## 2021-07-14 DIAGNOSIS — Z12.31 VISIT FOR SCREENING MAMMOGRAM: ICD-10-CM

## 2021-07-14 PROCEDURE — 77067 SCR MAMMO BI INCL CAD: CPT

## 2021-08-03 ENCOUNTER — HOSPITAL ENCOUNTER (OUTPATIENT)
Dept: LAB | Age: 46
Discharge: HOME | End: 2021-08-03

## 2021-12-02 ENCOUNTER — TRANSCRIBE ORDER (OUTPATIENT)
Dept: SCHEDULING | Age: 46
End: 2021-12-02

## 2021-12-02 DIAGNOSIS — Z12.31 ENCOUNTER FOR MAMMOGRAM TO ESTABLISH BASELINE MAMMOGRAM: Primary | ICD-10-CM

## 2022-03-19 PROBLEM — E66.01 OBESITY, MORBID (HCC): Status: ACTIVE | Noted: 2018-02-25

## 2023-01-11 ENCOUNTER — TRANSCRIBE ORDER (OUTPATIENT)
Dept: SCHEDULING | Age: 48
End: 2023-01-11

## 2023-01-11 DIAGNOSIS — Z12.31 ENCOUNTER FOR SCREENING MAMMOGRAM FOR MALIGNANT NEOPLASM OF BREAST: Primary | ICD-10-CM

## 2023-04-22 DIAGNOSIS — Z12.31 ENCOUNTER FOR SCREENING MAMMOGRAM FOR MALIGNANT NEOPLASM OF BREAST: Primary | ICD-10-CM
